# Patient Record
Sex: MALE | Race: WHITE
[De-identification: names, ages, dates, MRNs, and addresses within clinical notes are randomized per-mention and may not be internally consistent; named-entity substitution may affect disease eponyms.]

---

## 2019-08-23 ENCOUNTER — HOSPITAL ENCOUNTER (INPATIENT)
Dept: HOSPITAL 43 - DL.ED | Age: 64
LOS: 4 days | Discharge: NURSING, REHAB OR LONG-TERM CARE | DRG: 720 | End: 2019-08-27
Attending: INTERNAL MEDICINE | Admitting: INTERNAL MEDICINE
Payer: COMMERCIAL

## 2019-08-23 DIAGNOSIS — J44.9: ICD-10-CM

## 2019-08-23 DIAGNOSIS — C61: ICD-10-CM

## 2019-08-23 DIAGNOSIS — R09.81: ICD-10-CM

## 2019-08-23 DIAGNOSIS — Z79.899: ICD-10-CM

## 2019-08-23 DIAGNOSIS — Z79.82: ICD-10-CM

## 2019-08-23 DIAGNOSIS — K52.9: ICD-10-CM

## 2019-08-23 DIAGNOSIS — Z92.3: ICD-10-CM

## 2019-08-23 DIAGNOSIS — E78.00: ICD-10-CM

## 2019-08-23 DIAGNOSIS — E83.39: ICD-10-CM

## 2019-08-23 DIAGNOSIS — G25.81: ICD-10-CM

## 2019-08-23 DIAGNOSIS — E83.42: ICD-10-CM

## 2019-08-23 DIAGNOSIS — Z91.09: ICD-10-CM

## 2019-08-23 DIAGNOSIS — I10: ICD-10-CM

## 2019-08-23 DIAGNOSIS — E53.8: ICD-10-CM

## 2019-08-23 DIAGNOSIS — K21.9: ICD-10-CM

## 2019-08-23 DIAGNOSIS — C79.51: ICD-10-CM

## 2019-08-23 DIAGNOSIS — F17.210: ICD-10-CM

## 2019-08-23 DIAGNOSIS — Z90.49: ICD-10-CM

## 2019-08-23 DIAGNOSIS — A41.9: Primary | ICD-10-CM

## 2019-08-23 LAB
ANION GAP SERPL CALC-SCNC: 15.3 MMOL/L
CHLORIDE SERPL-SCNC: 98 MMOL/L (ref 101–111)
SODIUM SERPL-SCNC: 135 MMOL/L (ref 135–145)

## 2019-08-23 RX ADMIN — POTASSIUM CHLORIDE SCH MEQ: 750 TABLET, FILM COATED, EXTENDED RELEASE ORAL at 18:41

## 2019-08-23 NOTE — PCM.HP
H&P History of Present Illness





- General


Date of Service: 08/23/19


Admit Problem/Dx: 


 Admission Diagnosis/Problem





Admission Diagnosis/Problem      Hypomagnesemia








Source of Information: Patient, Old Records, Provider


History Limitations: Reports: No Limitations





- History of Present Illness


Initial Comments - Free Text/Narative: 





Mr. Alcon Thomas is a 63 y.o male with medical history significant for 

prostate cancer  with osseous metastasis s/p radiation therapy, HLP, COPD, 

syncope and collapse, RLS, s/p small bowel resection, and hypomagnesemia who 

presented to the ED with complaints of diarrhea and chills. Patient reports 

that he was seen in clinic about a week ago and was noted to have low 

Magnesium. He was started on Magnesium oxide. He was instructed to take 1 to 

1.5 pills daily. He was instructed to not take 2 pills as he would have 

diarrhea. States he decided try two of the pills yesterday and has been having 

terrible diarrhea to the point of having clear stools since yesterday. He also 

reports chills. States he had nausea this morning but could only spit up clear 

sputum. Denies melena, hematochezia, dysuria, hematuria, edema, abdominal pain, 

chest pain, shortness of breath or fevers. Denies sick contacts or trying new 

meals. Has not eaten out for a few days. Denies any recent travel. 





Reports that he drank a bottle of wine coolers yesterday but before that last 

drink was about a month ago. Smokes 1-2 packs of cigarettes daily. Denies 

illicit drug use. 





- Related Data


Allergies/Adverse Reactions: 


 Allergies











Allergy/AdvReac Type Severity Reaction Status Date / Time


 


environmental AdvReac  runny Uncoded 08/23/19 16:02





   nose/congestion  











Home Medications: 


 Home Meds





Albuterol [Proventil HFA] 2 puff INH Q4H PRN 08/23/19 [History]


Ascorbic Acid [Vitamin C] 1,000 mg PO DAILY 08/23/19 [History]


Aspirin 81 mg PO DAILY 08/23/19 [History]


Calcium Carbonate/Vitamin D3 [Calcium 500 mg Chewable Tablet] 1,200 mg pe PO 

DAILY 08/23/19 [History]


Cholecalciferol (Vitamin D3) [Vitamin D3] 1,000 unit PO DAILY 08/23/19 [History]


Cyanocobalamin (Vitamin B-12) [B-12] 1,000 mcg PO DAILY 08/23/19 [History]


Gabapentin [Neurontin] 600 mg PO BEDTIME 08/23/19 [History]


Loratadine [Claritin] 10 mg PO DAILY 08/23/19 [History]


Magnesium Oxide 400 mg PO BID 08/23/19 [History]


Omeprazole 20 mg PO BID 08/23/19 [History]


Polyethylene Glycol 3350 [MiraLAX] 17 gm PO DAILY PRN 08/23/19 [History]


Potassium Chloride 20 meq PO BID 08/23/19 [History]


Simvastatin 20 mg PO DAILY 08/23/19 [History]


Vitamin E 100 unit PO DAILY 08/23/19 [History]


rOPINIRole HCl [Ropinirole HCl] 5 mg PO DAILY 08/23/19 [History]











Past Medical History


Cardiovascular History: Reports: High Cholesterol


Gastrointestinal History: Reports: GERD


Hematologic History: Reports: Anemia, B12 Deficiency


Oncologic (Cancer) History: Reports: Prostate, Other (See Below)


Other Oncologic History: Stage 4 with mets to bone





Social & Family History





- Tobacco Use


Smoking Status *Q: Current Every Day Smoker


Years of Tobacco use: 45


Packs/Tins Daily: 1.5





- Caffeine Use


Caffeine Use: Reports: Coffee, Soda





- Recreational Drug Use


Recreational Drug Use: No





H&P Review of Systems





- Review of Systems:


Review Of Systems: ROS reveals no pertinent complaints other than HPI.





Exam





- Exam


Exam: See Below





- Vital Signs


Vital Signs: 


 Last Vital Signs











Temp  98.7 F   08/23/19 12:57


 


Pulse  66   08/23/19 12:57


 


Resp  16   08/23/19 12:57


 


BP  106/60   08/23/19 12:57


 


Pulse Ox  97   08/23/19 12:57








 





Orthostatic Blood Pressure [     94/67


Standing]                        


Orthostatic Blood Pressure [     107/66


Sitting]                         


Orthostatic Blood Pressure [     106/60


Supine]                          








Weight: 132 lb 12.8 oz





- Exam


General: Alert, Oriented


HEENT: Conjunctiva Clear, EOMI, Hearing Intact, Mucosa Moist & Pink


Neck: Supple, Trachea Midline


Lungs: Clear to Auscultation, Normal Respiratory Effort


Cardiovascular: Regular Rate, Regular Rhythm, Normal S1, Normal S2


GI/Abdominal Exam: Normal Bowel Sounds, Soft, Non-Tender, No Distention, No 

Abnormal Bruit


Extremities: Normal Inspection, Non-Tender, No Pedal Edema


Skin: Warm, Dry, Intact


Neuro Extensive - Mental Status: Alert, Oriented x3, Normal Mood/Affect, Normal 

Cognition


Psychiatric: Alert, Normal Affect, Normal Mood





- Patient Data


Lab Results Last 24 hrs: 


 Laboratory Results - last 24 hr











  08/23/19 08/23/19 Range/Units





  13:37 13:37 


 


WBC  6.1   (5.0-10.0)  10^3/uL


 


RBC  4.20 L   (4.6-6.2)  10^6/uL


 


Hgb  13.0 L   (14.0-18.0)  g/dL


 


Hct  38.0 L   (40.0-54.0)  %


 


MCV  90.5   ()  fL


 


MCH  31.0   (27.0-34.0)  pg


 


MCHC  34.2   (33.0-35.0)  g/dL


 


Plt Count  222   (150-450)  10^3/uL


 


Neut % (Auto)  79.1 H   (42.2-75.2)  %


 


Lymph % (Auto)  12.2 L   (20.5-50.1)  %


 


Mono % (Auto)  7.4   (2-8)  %


 


Eos % (Auto)  1.0   (1.0-3.0)  %


 


Baso % (Auto)  0.3   (0.0-1.0)  %


 


Sodium   135  (135-145)  mmol/L


 


Potassium   4.3  (3.6-5.0)  mmol/L


 


Chloride   98 L  (101-111)  mmol/L


 


Carbon Dioxide   26.0  (21.0-31.0)  mmol/L


 


Anion Gap   15.3  


 


BUN   14  (7-18)  mg/dL


 


Creatinine   0.9  (0.6-1.3)  mg/dL


 


Est Cr Clr Drug Dosing   71.58  mL/min


 


Estimated GFR (MDRD)   > 60  


 


BUN/Creatinine Ratio   15.55  


 


Glucose   96  ()  mg/dL


 


Calcium   9.8  (8.4-10.2)  mg/dl


 


Magnesium   0.8 L*  (1.8-2.5)  mg/dL


 


Total Bilirubin   0.7  (0.2-1.0)  mg/dL


 


AST   20  (10-42)  IU/L


 


ALT   17  (10-60)  IU/L


 


Alkaline Phosphatase   59  ()  IU/L


 


Total Protein   7.7  (6.7-8.2)  g/dl


 


Albumin   3.9  (3.2-5.5)  g/dl


 


Globulin   3.8  


 


Albumin/Globulin Ratio   1.03  











Result Diagrams: 


 08/23/19 13:37





 08/23/19 13:37





EKG INTERPRETATION


EKG Date: 08/23/19


Rhythm: NSR


Axis: Normal


QRS: RBBB (Incomplete)


ST-T: Other (repolarization abnormalities due to incomplete RBBB)


QT: Normal





- Problem List


(1) Prostate cancer metastatic to bone


SNOMED Code(s): 206703552


   ICD Code: C61 - MALIGNANT NEOPLASM OF PROSTATE; C79.51 - SECONDARY MALIGNANT 

NEOPLASM OF BONE   Status: Acute   Current Visit: Yes   





(2) Tobacco use disorder


SNOMED Code(s): 366736062


   ICD Code: F17.200 - NICOTINE DEPENDENCE, UNSPECIFIED, UNCOMPLICATED   Status

: Acute   Current Visit: Yes   





(3) Diarrhea


SNOMED Code(s): 34955746


   ICD Code: R19.7 - DIARRHEA, UNSPECIFIED   Status: Acute   Current Visit: Yes

   





(4) Hypomagnesemia


SNOMED Code(s): 436791289


   ICD Code: E83.42 - HYPOMAGNESEMIA   Status: Acute   Current Visit: Yes   


Problem List Initiated/Reviewed/Updated: Yes


Orders Last 24hrs: 


 Active Orders 24 hr











 Category Date Time Status


 


 Admission Diagnosis [ADT] Routine ADT  08/23/19 14:44 Ordered


 


 Patient Status [ADT] Routine ADT  08/23/19 14:44 Active


 


 Cardiac Monitoring [RC] CONTINUOUS Care  08/23/19 15:54 Ordered


 


 EKG Documentation Completion [RC] URGENT Care  08/23/19 14:42 Active


 


 Oxygen Therapy [RC] PRN Care  08/23/19 15:54 Ordered


 


 VTE/DVT Education [RC] PER UNIT ROUTINE Care  08/23/19 15:54 Ordered


 


 Vital Signs [RC] Q4H Care  08/23/19 15:54 Ordered


 


 Regular Diet [DIET] Diet  08/23/19 Dinner Ordered


 


 BASIC METABOLIC PANEL,BMP [CHEM] AM Lab  08/24/19 05:11 Ordered


 


 MAGNESIUM [CHEM] AM Lab  08/24/19 05:11 Ordered


 


 PHOSPHORUS [CHEM] AM Lab  08/24/19 05:11 Ordered


 


 Acetaminophen [Tylenol] Med  08/23/19 15:54 Ordered





 650 mg PO Q4H PRN   


 


 Enoxaparin [Lovenox] Med  08/24/19 09:00 Ordered





 40 mg SUBCUT DAILY   


 


 Magnesium Sulfate/Water [Magnesium Sulfate in Water Med  08/23/19 17:00 Ordered





 Premix] 2 gm   





 Premix Bag 1 bag   





 IV ONETIME   


 


 Ondansetron [Zofran] Med  08/23/19 15:54 Ordered





 4 mg IVPUSH Q6H PRN   


 


 Sodium Chloride 0.9% @ 125 MLS/HR (1000ml) Med  08/23/19 16:00 Ordered





 Sodium Chloride 0.9% [Normal Saline] 1,000 ml   





 IV ASDIRECTED   


 


 Resuscitation Status Routine Resus Stat  08/23/19 15:54 Ordered








 Medication Orders





Acetaminophen (Tylenol)  650 mg PO Q4H PRN


   PRN Reason: Pain (Mild 1-3)/fever


Enoxaparin Sodium (Lovenox)  40 mg SUBCUT DAILY SHANNAN


Sodium Chloride (Normal Saline)  1,000 mls @ 125 mls/hr IV ASDIRECTED SHANNAN


Magnesium Sulfate 2 gm/ Premix  50 mls @ 25 mls/hr IV ONETIME ONE


   Stop: 08/23/19 18:59


Ondansetron HCl (Zofran)  4 mg IVPUSH Q6H PRN


   PRN Reason: Nausea/Vomiting








Assessment/Plan Comment:: 





#Hypomagnesemia: chronic; uncertain etiology. Patient with Mag of 0.6 on 8/15/

19. Was started on oral replacement. Was 0.8 today. 


- Start on IV mag sulfate. 


- Check mag after 3 gm of Mag sulfate


- Tele monitoring





#Diarrhea: likely due to Mag oxide. 


- Denies abdominal pain, recent antibiotics, and recent travel


- No new foods. 


- LFTs normal


- Has had 4 watery bowel movements today. 


- IV fluids. 


- If diarrhea is not improving, will obtain stool studies. 





#Tobacco use disorder: smoking 1.5 - 2 packs of cigarettes daily


- NRT


- Smoking cessation counseling provided





#Prostate cancer with mets


- Follows with oncology





#COPD: not in exacerbation. 


- Continue home meds





#HLP


#HTN


- Continue home meds. 





DVT PPx: Lovenox


GI: Regular diet





Code status: Full code

## 2019-08-23 NOTE — EDM.PDOC
ED HPI GENERAL MEDICAL PROBLEM





- General


Chief Complaint: Gastrointestinal Problem


Stated Complaint: SEVERE DIARRHEA AND CHILLS


Time Seen by Provider: 08/23/19 13:45


Source of Information: Reports: Patient


History Limitations: Reports: No Limitations





- History of Present Illness


INITIAL COMMENTS - FREE TEXT/NARRATIVE: 





This 64 yo male patient reports to the ED due to diarrhea (x 2 days) and chills 

today. The patient reports he has had at least 5 watery bowel movements today 

and numerous yesterday. The patient reports he has stage 4 prostate cancer with 

mets to the back, ribs and upper abdomen. The patient was recently diagnosed 

with hypomagnesemia and placed on oral magnesium tablets. The dosing was 

increased 2 days ago and his diarrhea started yesterday. The patient reports he 

has been eating and drinking normally, but feels very thirsty. 


Onset Date: 08/22/19


Duration: Constant


Location: Reports: Abdomen


Quality: Reports: Other


Severity: Moderate


Improves with: Reports: None


Worsens with: Reports: None


Context: Reports: Other


Associated Symptoms: Reports: No Other Symptoms





- Related Data


 Allergies











Allergy/AdvReac Type Severity Reaction Status Date / Time


 


environmental AdvReac  runny Uncoded 08/23/19 13:05





   nose/congestion  











Home Meds: 


 Home Meds





Aspirin 81 mg PO DAILY 08/23/19 [History]


Omeprazole 20 mg PO BID 08/23/19 [History]


Simvastatin 20 mg PO DAILY 08/23/19 [History]











Past Medical History


Cardiovascular History: Reports: High Cholesterol


Gastrointestinal History: Reports: GERD


Hematologic History: Reports: Anemia, B12 Deficiency


Oncologic (Cancer) History: Reports: Prostate, Other (See Below)


Other Oncologic History: Stage 4 with mets to bone





Social & Family History





- Tobacco Use


Smoking Status *Q: Current Every Day Smoker


Years of Tobacco use: 45


Packs/Tins Daily: 1.5





- Caffeine Use


Caffeine Use: Reports: Coffee, Soda





- Recreational Drug Use


Recreational Drug Use: No





ED ROS GENERAL





- Review of Systems


Review Of Systems: ROS reveals no pertinent complaints other than HPI.





ED EXAM, GI/ABD





- Physical Exam


Exam: See Below


Exam Limited By: No Limitations


General Appearance: Alert, WD/WN, No Apparent Distress


Eyes: Bilateral: Normal Appearance, EOMI


Ears: Normal External Exam, Normal Canal, Hearing Grossly Normal, Normal TMs


Nose: Normal Inspection, Normal Mucosa, No Blood


Throat/Mouth: Normal Inspection, Normal Lips, Normal Teeth, Normal Gums, Normal 

Oropharynx, Normal Voice, No Airway Compromise


Head: Atraumatic, Normocephalic


Neck: Normal Inspection, Supple, Non-Tender, Full Range of Motion


Respiratory/Chest: No Respiratory Distress, Lungs Clear, Normal Breath Sounds, 

No Accessory Muscle Use, Chest Non-Tender


Cardiovascular: Normal Peripheral Pulses, Regular Rate, Rhythm, No Edema, No 

Gallop, No JVD, No Murmur, No Rub


GI/Abdominal Exam: Normal Bowel Sounds, Soft, Non-Tender, No Organomegaly, No 

Distention, No Abnormal Bruit, No Mass, Pelvis Stable


 (Male) Exam: Deferred


Rectal (Males) Exam: Deferred


Back Exam: Normal Inspection, Full Range of Motion, NT


Extremities: Normal Inspection, Normal Range of Motion, Non-Tender, Normal 

Capillary Refill, No Pedal Edema


Neurological: Alert, Oriented, CN II-XII Intact, Normal Cognition, Normal Gait, 

Normal Reflexes, No Motor/Sensory Deficits


Psychiatric: Normal Affect, Normal Mood


Skin Exam: Warm, Dry, Intact, Normal Color, No Rash


Lymphatic: No Adenopathy





Course





- Vital Signs


Last Recorded V/S: 


 Last Vital Signs











Temp  37.1 C   08/23/19 12:57


 


Pulse  66   08/23/19 12:57


 


Resp  16   08/23/19 12:57


 


BP  106/60   08/23/19 12:57


 


Pulse Ox  97   08/23/19 12:57








 





Orthostatic Blood Pressure [     94/67


Standing]                        


Orthostatic Blood Pressure [     107/66


Sitting]                         


Orthostatic Blood Pressure [     106/60


Supine]                          











- Orders/Labs/Meds


Orders: 


 Active Orders 24 hr











 Category Date Time Status


 


 EKG Documentation Completion [RC] URGENT Care  08/23/19 14:42 Ordered











Labs: 


 Laboratory Tests











  08/23/19 08/23/19 Range/Units





  13:37 13:37 


 


WBC  6.1   (5.0-10.0)  10^3/uL


 


RBC  4.20 L   (4.6-6.2)  10^6/uL


 


Hgb  13.0 L   (14.0-18.0)  g/dL


 


Hct  38.0 L   (40.0-54.0)  %


 


MCV  90.5   ()  fL


 


MCH  31.0   (27.0-34.0)  pg


 


MCHC  34.2   (33.0-35.0)  g/dL


 


Plt Count  222   (150-450)  10^3/uL


 


Neut % (Auto)  79.1 H   (42.2-75.2)  %


 


Lymph % (Auto)  12.2 L   (20.5-50.1)  %


 


Mono % (Auto)  7.4   (2-8)  %


 


Eos % (Auto)  1.0   (1.0-3.0)  %


 


Baso % (Auto)  0.3   (0.0-1.0)  %


 


Sodium   135  (135-145)  mmol/L


 


Potassium   4.3  (3.6-5.0)  mmol/L


 


Chloride   98 L  (101-111)  mmol/L


 


Carbon Dioxide   26.0  (21.0-31.0)  mmol/L


 


Anion Gap   15.3  


 


BUN   14  (7-18)  mg/dL


 


Creatinine   0.9  (0.6-1.3)  mg/dL


 


Est Cr Clr Drug Dosing   71.58  mL/min


 


Estimated GFR (MDRD)   > 60  


 


BUN/Creatinine Ratio   15.55  


 


Glucose   96  ()  mg/dL


 


Calcium   9.8  (8.4-10.2)  mg/dl


 


Magnesium   0.8 L*  (1.8-2.5)  mg/dL


 


Total Bilirubin   0.7  (0.2-1.0)  mg/dL


 


AST   20  (10-42)  IU/L


 


ALT   17  (10-60)  IU/L


 


Alkaline Phosphatase   59  ()  IU/L


 


Total Protein   7.7  (6.7-8.2)  g/dl


 


Albumin   3.9  (3.2-5.5)  g/dl


 


Globulin   3.8  


 


Albumin/Globulin Ratio   1.03  











Meds: 


Medications














Discontinued Medications














Generic Name Dose Route Start Last Admin





  Trade Name Freq  PRN Reason Stop Dose Admin


 


Sodium Chloride  1,000 mls @ 999 mls/hr  08/23/19 13:21  08/23/19 13:41





  Normal Saline  IV  08/23/19 14:21  999 mls/hr





  .BOLUS ONE   Administration





     





     





     





     














Departure





- Departure


Time of Disposition: 14:42


Disposition: Admitted As Inpatient 66


Condition: Fair


Clinical Impression: 


 Hypomagnesemia, Diarrhea








- Discharge Information


*PRESCRIPTION DRUG MONITORING PROGRAM REVIEWED*: Not Applicable


*COPY OF PRESCRIPTION DRUG MONITORING REPORT IN PATIENT BRANDY: Not Applicable


Forms:  ED Department Discharge


Care Plan Goals: 


Discussed the history, examination, lab and treatments with Dr. Pack. Dr. Pack accepted the patient for continued evaluation and treatment as an 

inpatient at [facility] in [place]. 





- My Orders


Last 24 Hours: 


My Active Orders





08/23/19 14:42


EKG Documentation Completion [RC] URGENT 














- Assessment/Plan


Last 24 Hours: 


My Active Orders





08/23/19 14:42


EKG Documentation Completion [RC] URGENT

## 2019-08-24 LAB
ANION GAP SERPL CALC-SCNC: 11.3 MMOL/L
ANION GAP SERPL CALC-SCNC: 9.8 MMOL/L
CHLORIDE SERPL-SCNC: 104 MMOL/L (ref 101–111)
CHLORIDE SERPL-SCNC: 105 MMOL/L (ref 101–111)
SODIUM SERPL-SCNC: 136 MMOL/L (ref 135–145)
SODIUM SERPL-SCNC: 137 MMOL/L (ref 135–145)

## 2019-08-24 RX ADMIN — OMEPRAZOLE SCH MG: 20 CAPSULE, DELAYED RELEASE ORAL at 16:22

## 2019-08-24 RX ADMIN — POTASSIUM CHLORIDE SCH: 750 TABLET, FILM COATED, EXTENDED RELEASE ORAL at 09:46

## 2019-08-24 RX ADMIN — OMEPRAZOLE SCH MG: 20 CAPSULE, DELAYED RELEASE ORAL at 06:10

## 2019-08-24 RX ADMIN — VITAMIN E CAP 400 UNIT SCH UNITS: 400 CAP at 09:44

## 2019-08-24 RX ADMIN — CYANOCOBALAMIN TAB 1000 MCG SCH MCG: 1000 TAB at 09:43

## 2019-08-24 RX ADMIN — POTASSIUM CHLORIDE SCH MEQ: 750 TABLET, FILM COATED, EXTENDED RELEASE ORAL at 17:59

## 2019-08-24 RX ADMIN — Medication SCH TAB: at 09:44

## 2019-08-24 RX ADMIN — VITAMIN D, TAB 1000IU (100/BT) SCH MCG: 25 TAB at 09:43

## 2019-08-24 NOTE — PCM.PN
- General Info


Date of Service: 08/24/19


Admission Dx/Problem (Free Text): 


 Admission Diagnosis/Problem





Admission Diagnosis/Problem      Hypomagnesemia








Subjective Update: 





Reports 1 watery bowel movement overnight. States he was congested overnight 

and could not sleep much.  Denies fevers, chills, chest pain, shortness of 

breath, n/v, dysuria, or hematuria. 





- Review of Systems


General: Reports: No Symptoms


HEENT: Reports: Sinus Congestion


Pulmonary: Reports: No Symptoms


Cardiovascular: Reports: No Symptoms


Gastrointestinal: Reports: Diarrhea (x1 this morning. )


Genitourinary: Reports: No Symptoms


Musculoskeletal: Reports: No Symptoms


Skin: Reports: No Symptoms


Neurological: Reports: No Symptoms


Psychiatric: Reports: No Symptoms





- Patient Data


Vitals - Most Recent: 


 Last Vital Signs











Temp  99.2 F   08/23/19 19:55


 


Pulse  71   08/23/19 19:55


 


Resp  20   08/23/19 19:55


 


BP  102/60   08/23/19 19:55


 


Pulse Ox  96   08/23/19 19:55








 





Orthostatic Blood Pressure [     94/67


Standing]                        


Orthostatic Blood Pressure [     107/66


Sitting]                         


Orthostatic Blood Pressure [     106/60


Supine]                          








Weight - Most Recent: 132 lb 12.8 oz


I&O - Last 24 Hours: 


 Intake & Output











 08/23/19 08/24/19 08/24/19





 22:59 06:59 14:59


 


Intake Total 691  


 


Output Total 150 200 


 


Balance 541 -200 











Lab Results Last 24 Hours: 


 Laboratory Results - last 24 hr











  08/23/19 08/23/19 08/23/19 Range/Units





  13:37 13:37 20:30 


 


WBC  6.1    (5.0-10.0)  10^3/uL


 


RBC  4.20 L    (4.6-6.2)  10^6/uL


 


Hgb  13.0 L    (14.0-18.0)  g/dL


 


Hct  38.0 L    (40.0-54.0)  %


 


MCV  90.5    ()  fL


 


MCH  31.0    (27.0-34.0)  pg


 


MCHC  34.2    (33.0-35.0)  g/dL


 


Plt Count  222    (150-450)  10^3/uL


 


Neut % (Auto)  79.1 H    (42.2-75.2)  %


 


Lymph % (Auto)  12.2 L    (20.5-50.1)  %


 


Mono % (Auto)  7.4    (2-8)  %


 


Eos % (Auto)  1.0    (1.0-3.0)  %


 


Baso % (Auto)  0.3    (0.0-1.0)  %


 


Sodium   135   (135-145)  mmol/L


 


Potassium   4.3   (3.6-5.0)  mmol/L


 


Chloride   98 L   (101-111)  mmol/L


 


Carbon Dioxide   26.0   (21.0-31.0)  mmol/L


 


Anion Gap   15.3   


 


BUN   14   (7-18)  mg/dL


 


Creatinine   0.9   (0.6-1.3)  mg/dL


 


Est Cr Clr Drug Dosing   71.58   mL/min


 


Estimated GFR (MDRD)   > 60   


 


BUN/Creatinine Ratio   15.55   


 


Glucose   96   ()  mg/dL


 


Calcium   9.8   (8.4-10.2)  mg/dl


 


Phosphorus     (2.5-4.6)  mg/dL


 


Magnesium   0.8 L*  2.1  (1.8-2.5)  mg/dL


 


Total Bilirubin   0.7   (0.2-1.0)  mg/dL


 


AST   20   (10-42)  IU/L


 


ALT   17   (10-60)  IU/L


 


Alkaline Phosphatase   59   ()  IU/L


 


Total Protein   7.7   (6.7-8.2)  g/dl


 


Albumin   3.9   (3.2-5.5)  g/dl


 


Globulin   3.8   


 


Albumin/Globulin Ratio   1.03   














  08/24/19 Range/Units





  06:00 


 


WBC   (5.0-10.0)  10^3/uL


 


RBC   (4.6-6.2)  10^6/uL


 


Hgb   (14.0-18.0)  g/dL


 


Hct   (40.0-54.0)  %


 


MCV   ()  fL


 


MCH   (27.0-34.0)  pg


 


MCHC   (33.0-35.0)  g/dL


 


Plt Count   (150-450)  10^3/uL


 


Neut % (Auto)   (42.2-75.2)  %


 


Lymph % (Auto)   (20.5-50.1)  %


 


Mono % (Auto)   (2-8)  %


 


Eos % (Auto)   (1.0-3.0)  %


 


Baso % (Auto)   (0.0-1.0)  %


 


Sodium  137  (135-145)  mmol/L


 


Potassium  4.3  (3.6-5.0)  mmol/L


 


Chloride  104  (101-111)  mmol/L


 


Carbon Dioxide  26.0  (21.0-31.0)  mmol/L


 


Anion Gap  11.3  


 


BUN  15  (7-18)  mg/dL


 


Creatinine  0.8  (0.6-1.3)  mg/dL


 


Est Cr Clr Drug Dosing  80.53  mL/min


 


Estimated GFR (MDRD)  > 60  


 


BUN/Creatinine Ratio   


 


Glucose  112 H  ()  mg/dL


 


Calcium  8.2 L D  (8.4-10.2)  mg/dl


 


Phosphorus  2.3 L  (2.5-4.6)  mg/dL


 


Magnesium  1.8  (1.8-2.5)  mg/dL


 


Total Bilirubin   (0.2-1.0)  mg/dL


 


AST   (10-42)  IU/L


 


ALT   (10-60)  IU/L


 


Alkaline Phosphatase   ()  IU/L


 


Total Protein   (6.7-8.2)  g/dl


 


Albumin   (3.2-5.5)  g/dl


 


Globulin   


 


Albumin/Globulin Ratio   











Med Orders - Current: 


 Current Medications





Acetaminophen (Tylenol)  650 mg PO Q4H PRN


   PRN Reason: Pain (Mild 1-3)/fever


Albuterol (Proventil Hfa)  0 gm INH Q4H PRN


   PRN Reason: Wheezing


Ascorbic Acid (Vitamin C)  1,000 mg PO DAILY [facility]


   Last Admin: 08/24/19 09:44 Dose:  1,000 mg


Aspirin (Aspirin)  81 mg PO DAILY [facility]


   Last Admin: 08/24/19 09:44 Dose:  81 mg


Calcium Carbonate (Calcium Carbonate/Vitamin D 1250 Mg-200 Unit)  1 tab PO 

DAILY [facility]


   Last Admin: 08/24/19 09:44 Dose:  1 tab


Cholecalciferol (Vitamin D3)  25 mcg PO DAILY [facility]


   Last Admin: 08/24/19 09:43 Dose:  25 mcg


Cyanocobalamin (Vitamin B12)  1,000 mcg PO DAILY [facility]


   Last Admin: 08/24/19 09:43 Dose:  1,000 mcg


Enoxaparin Sodium (Lovenox)  40 mg SUBCUT DAILY [facility]


   Last Admin: 08/24/19 09:46 Dose:  Not Given


Gabapentin (Neurontin)  600 mg PO BEDTIME [facility]


   Last Admin: 08/23/19 20:36 Dose:  600 mg


Sodium Chloride (Normal Saline)  1,000 mls @ 500 mls/hr IV .BOLUS ONE


   Stop: 08/24/19 11:52


Loratadine (Claritin)  10 mg PO DAILY [facility]


   Last Admin: 08/24/19 09:43 Dose:  10 mg


Miscellaneous Information (Check Patch)  1 ea TRDERM BEDTIME [facility]


   Last Admin: 08/23/19 20:38 Dose:  1 ea


Nicotine (Habitrol)  21 mg TRDERM DAILY [facility]


   Last Admin: 08/24/19 09:46 Dose:  21 mg


Omeprazole (Omeprazole)  20 mg PO BIDAC [facility]


   Last Admin: 08/24/19 06:10 Dose:  20 mg


Ondansetron HCl (Zofran)  4 mg IVPUSH Q6H PRN


   PRN Reason: Nausea/Vomiting


Potassium Chloride (Klor-Con 10)  20 meq PO BIDMEALS [facility]


   Last Admin: 08/24/19 09:46 Dose:  Not Given


Ropinirole HCl (Requip)  5 mg PO BEDTIME [facility]


   Last Admin: 08/23/19 20:36 Dose:  5 mg


Simvastatin (Zocor)  20 mg PO BEDTIME [facility]


   Last Admin: 08/23/19 20:36 Dose:  20 mg


Vitamin E (Vitamin E)  400 units PO DAILY [facility]


   Last Admin: 08/24/19 09:44 Dose:  400 units





Discontinued Medications





Sodium Chloride (Normal Saline)  1,000 mls @ 999 mls/hr IV .BOLUS ONE


   Stop: 08/23/19 14:21


   Last Admin: 08/23/19 13:41 Dose:  999 mls/hr


Magnesium Sulfate/Dextrose 1 (gm/ Premix)  100 mls @ 100 mls/hr IV ONETIME ONE


   Stop: 08/23/19 15:48


   Last Infusion: 08/23/19 17:18 Dose:  Infused


Sodium Chloride (Normal Saline)  1,000 mls @ 125 mls/hr IV ASDIRECTED [facility]


   Last Admin: 08/23/19 17:16 Dose:  125 mls/hr


Magnesium Sulfate 2 gm/ Premix  50 mls @ 25 mls/hr IV ONETIME ONE


   Stop: 08/23/19 18:59


   Last Admin: 08/23/19 17:14 Dose:  25 mls/hr


Magnesium Oxide (Magnesium Oxide)  250 mg PO WITHBREAKFAST [facility]


Sodium Phosphate (Neutra-Phos)  500 mg PO ONETIME ONE


   Stop: 08/24/19 09:25











- Exam


General: Alert, Oriented, No Acute Distress


HEENT: Pupils Equal, Pupils Reactive, Mucous Membr. Moist/Pink


Neck: Supple


Lungs: Clear to Auscultation, Normal Respiratory Effort


Cardiovascular: Regular Rate, Regular Rhythm


GI/Abdominal Exam: Normal Bowel Sounds, Soft, Non-Tender, No Distention


Extremities: Non-Tender, No Pedal Edema, Mottled


Skin: Warm, Dry, Intact


Neurological: No New Focal Deficit


Psy/Mental Status: Alert, Normal Affect, Normal Mood





- Problem List & Annotations


(1) Prostate cancer metastatic to bone


SNOMED Code(s): 838834480


   Code(s): C61 - MALIGNANT NEOPLASM OF PROSTATE; C79.51 - SECONDARY MALIGNANT 

NEOPLASM OF BONE   Status: Acute   Current Visit: Yes   





(2) Tobacco use disorder


SNOMED Code(s): 866165967


   Code(s): F17.200 - NICOTINE DEPENDENCE, UNSPECIFIED, UNCOMPLICATED   Status: 

Acute   Current Visit: Yes   





(3) Diarrhea


SNOMED Code(s): 31751971


   Code(s): R19.7 - DIARRHEA, UNSPECIFIED   Status: Acute   Current Visit: Yes 

  





(4) Hypomagnesemia


SNOMED Code(s): 370594513


   Code(s): E83.42 - HYPOMAGNESEMIA   Status: Acute   Current Visit: Yes   





- Problem List Review


Problem List Initiated/Reviewed/Updated: Yes





- My Orders


Last 24 Hours: 


My Active Orders





08/23/19 15:54


Cardiac Monitoring [RC] 09,21 


Oxygen Therapy [RC] PRN 


VTE/DVT Education [RC] PER UNIT ROUTINE 


Vital Signs [RC] Q4H 


Acetaminophen [Tylenol]   650 mg PO Q4H PRN 


Ondansetron [Zofran]   4 mg IVPUSH Q6H PRN 


Resuscitation Status Routine 





08/23/19 16:09


Albuterol [Proventil HFA]   0 gm INH Q4H PRN 





08/23/19 16:15


Nicotine [Habitrol]   21 mg TRDERM DAILY 





08/23/19 18:00


Potassium Chloride [Klor-Con 10]   20 meq PO BIDMEALS 





08/23/19 21:00


Check Patch   1 ea TRDERM BEDTIME 


Gabapentin [Neurontin]   600 mg PO BEDTIME 


Simvastatin [Zocor]   20 mg PO BEDTIME 


rOPINIRole [Requip]   5 mg PO BEDTIME 





08/23/19 Dinner


Regular Diet [DIET] 





08/24/19 06:00


Omeprazole   20 mg PO BIDAC 





08/24/19 09:00


Ascorbic Acid [Vitamin C]   1,000 mg PO DAILY 


Aspirin   81 mg PO DAILY 


Calcium Carbonate/Vitamin D3 [Calcium Carbonate/Vitamin D 1250 MG-200 Unit]   1 

tab PO DAILY 


Cholecalciferol (Vitamin D3) [Vitamin D3]   25 mcg PO DAILY 


Cyanocobalamin (Vitamin B12) [Vitamin B12]   1,000 mcg PO DAILY 


Enoxaparin [Lovenox]   40 mg SUBCUT DAILY 


Loratadine [Claritin]   10 mg PO DAILY 


Vitamin E (dl, acetate) [Vitamin E]   400 units PO DAILY 





08/24/19 09:53


Sodium Chloride 0.9% [Normal Saline] 1,000 ml IV .BOLUS 














- Plan


Plan:: 





#Hypomagnesemia: Improved. chronic; uncertain etiology. Patient with Mag of 0.6 

on 8/15/19. Was started on oral replacement. Was 0.8 on admit. 


- Went up to 2.1 after 3 g of mag sulfate. 


- Down to 1.8 this morning. 


- Due to recurrence of diarrhea, will give 1g IV mag sulfate and recheck 

levels. 


- If within normal range, will d/c to follow up as outpatient.


- Tele monitoring





#Diarrhea: likely due to Mag oxide. 


- Denies abdominal pain, recent antibiotics, and recent travel


- No new foods. 


- LFTs normal


- Has had 4 watery bowel movements on day of admit. 


- Diarrhea resolved until this morning with 1 watery bowel movement. 


- IV fluids. 





#Nasal congestion:


- Saline nasal spray.





#Tobacco use disorder: smoking 1.5 - 2 packs of cigarettes daily


- NRT


- Smoking cessation counseling provided





#Prostate cancer with mets


- Follows with oncology





#COPD: not in exacerbation. 


- Continue home meds





#HLP


#HTN: BP is at goal, on the lower side today.


- Continue zocor


- Give 1000 cc of IVF over two hours





DVT PPx: Lovenox


GI: Regular diet





Code status: Full code

## 2019-08-25 LAB
ANION GAP SERPL CALC-SCNC: 11.5 MMOL/L
CHLORIDE SERPL-SCNC: 105 MMOL/L (ref 101–111)
SODIUM SERPL-SCNC: 134 MMOL/L (ref 135–145)

## 2019-08-25 RX ADMIN — CYANOCOBALAMIN TAB 1000 MCG SCH MCG: 1000 TAB at 08:30

## 2019-08-25 RX ADMIN — OMEPRAZOLE SCH MG: 20 CAPSULE, DELAYED RELEASE ORAL at 05:57

## 2019-08-25 RX ADMIN — OMEPRAZOLE SCH MG: 20 CAPSULE, DELAYED RELEASE ORAL at 15:29

## 2019-08-25 RX ADMIN — VITAMIN D, TAB 1000IU (100/BT) SCH MCG: 25 TAB at 08:30

## 2019-08-25 RX ADMIN — Medication SCH TAB: at 08:30

## 2019-08-25 RX ADMIN — POTASSIUM CHLORIDE SCH MEQ: 750 TABLET, FILM COATED, EXTENDED RELEASE ORAL at 17:40

## 2019-08-25 RX ADMIN — POTASSIUM CHLORIDE SCH MEQ: 750 TABLET, FILM COATED, EXTENDED RELEASE ORAL at 08:30

## 2019-08-25 RX ADMIN — VITAMIN E CAP 400 UNIT SCH UNITS: 400 CAP at 08:30

## 2019-08-25 NOTE — PCM.PN
- General Info


Date of Service: 08/25/19


Admission Dx/Problem (Free Text): 


 Admission Diagnosis/Problem





Admission Diagnosis/Problem      Hypomagnesemia








Subjective Update: 





Reports 3 watery bowel movements this morning. Was febrile last night and this 

morning. Denies chest pain, shortness of breath, n/v, dysuria, or hematuria. 





- Patient Data


Vitals - Most Recent: 


 Last Vital Signs











Temp  100.5 F   08/25/19 07:47


 


Pulse  80   08/25/19 07:47


 


Resp  18   08/25/19 07:47


 


BP  118/60   08/25/19 07:47


 


Pulse Ox  93 L  08/25/19 07:47








 





Orthostatic Blood Pressure [     94/67


Standing]                        


Orthostatic Blood Pressure [     107/66


Sitting]                         


Orthostatic Blood Pressure [     106/60


Supine]                          








Weight - Most Recent: 132 lb 12.8 oz


I&O - Last 24 Hours: 


 Intake & Output











 08/24/19 08/25/19 08/25/19





 22:59 06:59 14:59


 


Intake Total 587 203 180


 


Balance 587 203 180











Lab Results Last 24 Hours: 


 Laboratory Results - last 24 hr











  08/24/19 08/24/19 08/24/19 Range/Units





  12:39 12:39 12:39 


 


D-Dimer, Quantitative  522 H    (0-400)  ng/mL


 


Sodium   136   (135-145)  mmol/L


 


Potassium   3.8   (3.6-5.0)  mmol/L


 


Chloride   105   (101-111)  mmol/L


 


Carbon Dioxide   25.0   (21.0-31.0)  mmol/L


 


Anion Gap   9.8   


 


BUN   13   (7-18)  mg/dL


 


Creatinine   0.8   (0.6-1.3)  mg/dL


 


Est Cr Clr Drug Dosing   80.53   mL/min


 


Estimated GFR (MDRD)   > 60   


 


Glucose   99   ()  mg/dL


 


Calcium   7.6 L   (8.4-10.2)  mg/dl


 


Phosphorus   2.1 L   (2.5-4.6)  mg/dL


 


Magnesium   2.1   (1.8-2.5)  mg/dL


 


Troponin I   < 0.02   (0.00-0.02)  ng/ml


 


Albumin    2.9 L  (3.2-5.5)  g/dl














  08/24/19 Range/Units





  18:40 


 


D-Dimer, Quantitative   (0-400)  ng/mL


 


Sodium   (135-145)  mmol/L


 


Potassium   (3.6-5.0)  mmol/L


 


Chloride   (101-111)  mmol/L


 


Carbon Dioxide   (21.0-31.0)  mmol/L


 


Anion Gap   


 


BUN   (7-18)  mg/dL


 


Creatinine   (0.6-1.3)  mg/dL


 


Est Cr Clr Drug Dosing   mL/min


 


Estimated GFR (MDRD)   


 


Glucose   ()  mg/dL


 


Calcium   (8.4-10.2)  mg/dl


 


Phosphorus   (2.5-4.6)  mg/dL


 


Magnesium   (1.8-2.5)  mg/dL


 


Troponin I  < 0.02  (0.00-0.02)  ng/ml


 


Albumin   (3.2-5.5)  g/dl











Med Orders - Current: 


 Current Medications





Acetaminophen (Tylenol)  650 mg PO Q6H PRN


   PRN Reason: Fever Greater Than 101


   Last Admin: 08/25/19 08:33 Dose:  650 mg


Albuterol (Proventil Hfa)  0 gm INH Q4H PRN


   PRN Reason: Wheezing


Ascorbic Acid (Vitamin C)  1,000 mg PO DAILY [facility]


   Last Admin: 08/25/19 08:30 Dose:  1,000 mg


Aspirin (Aspirin)  81 mg PO DAILY [facility]


   Last Admin: 08/25/19 08:30 Dose:  81 mg


Calcium Carbonate (Calcium Carbonate/Vitamin D 1250 Mg-200 Unit)  1 tab PO 

DAILY [facility]


   Last Admin: 08/25/19 08:30 Dose:  1 tab


Cholecalciferol (Vitamin D3)  25 mcg PO DAILY [facility]


   Last Admin: 08/25/19 08:30 Dose:  25 mcg


Cyanocobalamin (Vitamin B12)  1,000 mcg PO DAILY [facility]


   Last Admin: 08/25/19 08:30 Dose:  1,000 mcg


Enoxaparin Sodium (Lovenox)  40 mg SUBCUT DAILY [facility]


   Last Admin: 08/25/19 08:32 Dose:  Not Given


Gabapentin (Neurontin)  600 mg PO BEDTIME [facility]


   Last Admin: 08/24/19 20:40 Dose:  600 mg


Piperacillin Sod/Tazobactam (Sod 3.375 gm/ Sodium Chloride)  100 mls @ 200 mls/

hr IV Q6H [facility]


   Last Admin: 08/25/19 10:58 Dose:  200 mls/hr


Vancomycin HCl 1 gm/ Sodium (Chloride)  250 mls @ 166.667 mls/hr IV Q12H [facility]


   Last Admin: 08/25/19 05:55 Dose:  166.667 mls/hr


Sodium Chloride (Normal Saline)  1,000 mls @ 75 mls/hr IV ASDIRECTED [facility]


   Stop: 08/26/19 11:16


Loratadine (Claritin)  10 mg PO DAILY [facility]


   Last Admin: 08/25/19 08:30 Dose:  10 mg


Miscellaneous Information (Check Patch)  1 ea TRDERM BEDTIME [facility]


   Last Admin: 08/24/19 21:00 Dose:  1 ea


Nicotine (Habitrol)  21 mg TRDERM DAILY [facility]


   Last Admin: 08/25/19 08:31 Dose:  21 mg


Omeprazole (Omeprazole)  20 mg PO BIDAC [facility]


   Last Admin: 08/25/19 05:57 Dose:  20 mg


Ondansetron HCl (Zofran)  4 mg IVPUSH Q6H PRN


   PRN Reason: Nausea/Vomiting


Potassium Chloride (Klor-Con 10)  20 meq PO BIDMEALS [facility]


   Last Admin: 08/25/19 08:30 Dose:  20 meq


Ropinirole HCl (Requip)  5 mg PO BEDTIME [facility]


   Last Admin: 08/24/19 20:41 Dose:  5 mg


Simvastatin (Zocor)  20 mg PO BEDTIME [facility]


   Last Admin: 08/24/19 20:40 Dose:  20 mg


Sodium Chloride ([place] Nasal Spray)  0 ml MIKE Q4H PRN


   PRN Reason: Congestion


Vancomycin HCl (Pharmacy To Dose - Vancomycin)  1 dose .XX ASDIRECTED [facility]


Vitamin E (Vitamin E)  400 units PO DAILY [facility]


   Last Admin: 08/25/19 08:30 Dose:  400 units





Discontinued Medications





Acetaminophen (Tylenol)  650 mg PO Q4H PRN


   PRN Reason: Pain (Mild 1-3)/fever


Sodium Chloride (Normal Saline)  1,000 mls @ 999 mls/hr IV .BOLUS ONE


   Stop: 08/23/19 14:21


   Last Admin: 08/23/19 13:41 Dose:  999 mls/hr


Magnesium Sulfate/Dextrose 1 (gm/ Premix)  100 mls @ 100 mls/hr IV ONETIME ONE


   Stop: 08/23/19 15:48


   Last Infusion: 08/23/19 17:18 Dose:  Infused


Sodium Chloride (Normal Saline)  1,000 mls @ 125 mls/hr IV ASDIRECTED [facility]


   Last Admin: 08/23/19 17:16 Dose:  125 mls/hr


Magnesium Sulfate 2 gm/ Premix  50 mls @ 25 mls/hr IV ONETIME ONE


   Stop: 08/23/19 18:59


   Last Admin: 08/23/19 17:14 Dose:  25 mls/hr


Sodium Chloride (Normal Saline)  1,000 mls @ 500 mls/hr IV .BOLUS ONE


   Stop: 08/24/19 11:52


   Last Admin: 08/24/19 10:08 Dose:  500 mls/hr


Magnesium Sulfate/Dextrose (Magnesium Sulfate In D5w 100 Premix)  100 mls @ 100 

mls/hr IV ONETIME ONE


   Stop: 08/24/19 11:59


   Last Admin: 08/24/19 11:03 Dose:  100 mls/hr


Magnesium Oxide (Magnesium Oxide)  250 mg PO WITHBREAKFAST [facility]


   Last Admin: 08/24/19 10:15 Dose:  Not Given


Sodium Phosphate (Neutra-Phos)  500 mg PO ONETIME ONE


   Stop: 08/24/19 09:25


   Last Admin: 08/24/19 10:08 Dose:  500 mg











- Exam


General: Alert, Oriented, Cooperative, Moderate Distress


HEENT: Pupils Equal, Pupils Reactive, Mucous Membr. Moist/Pink


Lungs: Clear to Auscultation, Normal Respiratory Effort


Cardiovascular: Regular Rate, Regular Rhythm


GI/Abdominal Exam: Normal Bowel Sounds, Soft, No Distention, Tender


Extremities: Normal Inspection, Non-Tender, No Pedal Edema


Skin: Warm, Dry, Intact


Neurological: No New Focal Deficit


Psy/Mental Status: Alert, Normal Affect, Normal Mood





- Problem List & Annotations


(1) Prostate cancer metastatic to bone


SNOMED Code(s): 109574745


   Code(s): C61 - MALIGNANT NEOPLASM OF PROSTATE; C79.51 - SECONDARY MALIGNANT 

NEOPLASM OF BONE   Status: Acute   Current Visit: Yes   





(2) Tobacco use disorder


SNOMED Code(s): 460607170


   Code(s): F17.200 - NICOTINE DEPENDENCE, UNSPECIFIED, UNCOMPLICATED   Status: 

Acute   Current Visit: Yes   





(3) Diarrhea


SNOMED Code(s): 05383974


   Code(s): R19.7 - DIARRHEA, UNSPECIFIED   Status: Acute   Current Visit: Yes 

  





(4) Hypomagnesemia


SNOMED Code(s): 433354313


   Code(s): E83.42 - HYPOMAGNESEMIA   Status: Acute   Current Visit: Yes   





- Problem List Review


Problem List Initiated/Reviewed/Updated: Yes





- My Orders


Last 24 Hours: 


My Active Orders





08/24/19 12:35


Sodium Chloride 0.65% [[place]cean Nasal Spray]   See Dose Instructions  MIKE Q4H PRN 





08/24/19 16:43


Blood Culture x2 Reflex Set [OM.PC] Stat 





08/24/19 16:51


Acetaminophen [Tylenol]   650 mg PO Q6H PRN 





08/24/19 17:00


Pharmacy to Dose - Vancomycin   1 dose .XX ASDIRECTED 


Piperacillin/Tazobactam [Zosyn] 3.375 gm   Sodium Chloride 0.9% [Normal Saline] 

100 ml IV Q6H 





08/24/19 17:46


CULTURE BLOOD [BC] Stat 


CULTURE BLOOD [BC] Stat 





08/24/19 18:00


Vancomycin 1 gm   Sodium Chloride 0.9% [Normal Saline] 250 ml IV Q12H 





08/25/19 10:43


BASIC METABOLIC PANEL,BMP [CHEM] Routine 


MAGNESIUM [CHEM] Routine 





08/25/19 11:12


Chest Abdomen Pelvis w Cont [CT] Urgent 





08/25/19 11:15


Sodium Chloride 0.9% @  75 MLS/HR(1000ml) Sodium Chloride 0.9% [Normal Saline] 1

,000 ml IV ASDIRECTED 














- Plan


Plan:: 





Sepsis: probable abdominal source. Patient with temp >102 and RR of 20. 

Immunocompromised. 


- Follow up on blood cultures


- Continue vancomycin and zosyn. 


- CT chest, abdomen, and pelvis, 


- Stool studies, lactic acid, and lipase. 


- Rule out C diff 





#Hypomagnesemia: Improved. chronic; uncertain etiology. Patient with Mag of 0.6 

on 8/15/19. Was started on oral replacement. Was 0.8 on admit. 


- Went up to 2.1 after 3 g of mag sulfate. 


-Trended down to 1.8. Was given an extra 1 g of IV mag oxide


- Tele monitoring





#Diarrhea: likely due to Mag oxide. Possibly infectious as patient continues to 

have diarrhea off mag oxide.


- Now with abdominal pain


- Denies recent antibiotics, and recent travel, or  new foods. 


- LFTs normal


- Had 4 watery bowel movements on day of admit. 


- IV fluids. 





#Nasal congestion:


- Saline nasal spray.





#Tobacco use disorder: smoking 1.5 - 2 packs of cigarettes daily


- NRT


- Smoking cessation counseling provided





#Prostate cancer with mets


- Follows with oncology


- I will notify oncologist about admission per patient's request. 





#COPD: not in exacerbation. 


- Continue home meds





#HLP


#HTN: BP is at goal, on the lower side today.


- Continue zocor


- Give 1000 cc of IVF over two hours





DVT PPx: Lovenox


GI: Regular diet





Code status: Full code

## 2019-08-26 LAB
ANION GAP SERPL CALC-SCNC: 10.5 MMOL/L
CHLORIDE SERPL-SCNC: 108 MMOL/L (ref 101–111)
SODIUM SERPL-SCNC: 135 MMOL/L (ref 135–145)

## 2019-08-26 RX ADMIN — VITAMIN E CAP 400 UNIT SCH UNITS: 400 CAP at 08:47

## 2019-08-26 RX ADMIN — DIBASIC SODIUM PHOSPHATE, MONOBASIC POTASSIUM PHOSPHATE AND MONOBASIC SODIUM PHOSPHATE SCH MG: 852; 155; 130 TABLET ORAL at 10:09

## 2019-08-26 RX ADMIN — DIBASIC SODIUM PHOSPHATE, MONOBASIC POTASSIUM PHOSPHATE AND MONOBASIC SODIUM PHOSPHATE SCH MG: 852; 155; 130 TABLET ORAL at 22:06

## 2019-08-26 RX ADMIN — OMEPRAZOLE SCH MG: 20 CAPSULE, DELAYED RELEASE ORAL at 05:16

## 2019-08-26 RX ADMIN — DIBASIC SODIUM PHOSPHATE, MONOBASIC POTASSIUM PHOSPHATE AND MONOBASIC SODIUM PHOSPHATE SCH MG: 852; 155; 130 TABLET ORAL at 13:44

## 2019-08-26 RX ADMIN — POTASSIUM CHLORIDE SCH MEQ: 750 TABLET, FILM COATED, EXTENDED RELEASE ORAL at 08:46

## 2019-08-26 RX ADMIN — POTASSIUM CHLORIDE SCH MEQ: 750 TABLET, FILM COATED, EXTENDED RELEASE ORAL at 17:37

## 2019-08-26 RX ADMIN — Medication SCH TAB: at 08:47

## 2019-08-26 RX ADMIN — OMEPRAZOLE SCH MG: 20 CAPSULE, DELAYED RELEASE ORAL at 16:08

## 2019-08-26 RX ADMIN — VITAMIN D, TAB 1000IU (100/BT) SCH MCG: 25 TAB at 08:55

## 2019-08-26 RX ADMIN — CYANOCOBALAMIN TAB 1000 MCG SCH MCG: 1000 TAB at 08:47

## 2019-08-26 NOTE — PCM.PN
- General Info


Date of Service: 08/26/19


Admission Dx/Problem (Free Text): 


 Admission Diagnosis/Problem





Admission Diagnosis/Problem      Hypomagnesemia








Subjective Update: 





Reports still having watery bowel movements this morning. Was afebrile last 

night and this morning. Denies chest pain, shortness of breath, n/v, dysuria, 

or hematuria. 





- Patient Data


Vitals - Most Recent: 


 Last Vital Signs











Temp  97.5 F   08/26/19 07:41


 


Pulse  65   08/26/19 07:41


 


Resp  18   08/26/19 07:41


 


BP  93/57 L  08/26/19 07:41


 


Pulse Ox  97   08/26/19 07:41








 





Orthostatic Blood Pressure [     94/67


Standing]                        


Orthostatic Blood Pressure [     107/66


Sitting]                         


Orthostatic Blood Pressure [     106/60


Supine]                          








Weight - Most Recent: 132 lb 12.8 oz


I&O - Last 24 Hours: 


 Intake & Output











 08/25/19 08/26/19 08/26/19





 22:59 06:59 14:59


 


Intake Total 868 1433 240


 


Balance 868 1433 240











Lab Results Last 24 Hours: 


 Laboratory Results - last 24 hr











  08/25/19 08/25/19 08/25/19 Range/Units





  10:43 12:57 12:57 


 


WBC     (5.0-10.0)  10^3/uL


 


RBC     (4.6-6.2)  10^6/uL


 


Hgb     (14.0-18.0)  g/dL


 


Hct     (40.0-54.0)  %


 


MCV     ()  fL


 


MCH     (27.0-34.0)  pg


 


MCHC     (33.0-35.0)  g/dL


 


Plt Count     (150-450)  10^3/uL


 


Sodium  134 L    (135-145)  mmol/L


 


Potassium  3.5 L    (3.6-5.0)  mmol/L


 


Chloride  105    (101-111)  mmol/L


 


Carbon Dioxide  21.0    (21.0-31.0)  mmol/L


 


Anion Gap  11.5    


 


BUN  13    (7-18)  mg/dL


 


Creatinine  1.0    (0.6-1.3)  mg/dL


 


Est Cr Clr Drug Dosing  64.42    mL/min


 


Estimated GFR (MDRD)  > 60    


 


Glucose  112 H    ()  mg/dL


 


Lactic Acid    1.6  (0.5-2.2)  mmol/L


 


Calcium  7.5 L    (8.4-10.2)  mg/dl


 


Phosphorus     (2.5-4.6)  mg/dL


 


Magnesium  1.6 L    (1.8-2.5)  mg/dL


 


Lipase   25   (22-51)  U/L














  08/26/19 08/26/19 Range/Units





  06:05 06:05 


 


WBC  7.3   (5.0-10.0)  10^3/uL


 


RBC  3.59 L   (4.6-6.2)  10^6/uL


 


Hgb  10.9 L D   (14.0-18.0)  g/dL


 


Hct  33.0 L   (40.0-54.0)  %


 


MCV  91.9   ()  fL


 


MCH  30.4   (27.0-34.0)  pg


 


MCHC  33.0   (33.0-35.0)  g/dL


 


Plt Count  190   (150-450)  10^3/uL


 


Sodium   135  (135-145)  mmol/L


 


Potassium   3.5 L  (3.6-5.0)  mmol/L


 


Chloride   108  (101-111)  mmol/L


 


Carbon Dioxide   20.0 L  (21.0-31.0)  mmol/L


 


Anion Gap   10.5  


 


BUN   9  (7-18)  mg/dL


 


Creatinine   0.9  (0.6-1.3)  mg/dL


 


Est Cr Clr Drug Dosing   71.58  mL/min


 


Estimated GFR (MDRD)   > 60  


 


Glucose   101  ()  mg/dL


 


Lactic Acid    (0.5-2.2)  mmol/L


 


Calcium   7.2 L  (8.4-10.2)  mg/dl


 


Phosphorus   1.5 L  (2.5-4.6)  mg/dL


 


Magnesium   1.7 L  (1.8-2.5)  mg/dL


 


Lipase    (22-51)  U/L











[place] Results Last 24 Hours: 


 Microbiology











 08/25/19 12:03 Stool Culture - Preliminary





 Stool / Feces    NORMAL ENTERIC OMAIRA.  NO SALMONELLA, SHIGELLA,





    CAMPYLOBACTER OR E.COLI O157 ISOLATED.


 


 08/24/19 17:46 Aerobic Blood Culture - Preliminary





 Blood - Venous - Lab Draw    NO GROWTH AFTER 1 DAY





 Anaerobic Blood Culture - Preliminary





    NO GROWTH AFTER 1 DAY


 


 08/24/19 17:46 Aerobic Blood Culture - Preliminary





 Blood - Venous    NO GROWTH AFTER 1 DAY





 Anaerobic Blood Culture - Preliminary





    NO GROWTH AFTER 1 DAY











Med Orders - Current: 


 Current Medications





Acetaminophen (Tylenol)  650 mg PO Q6H PRN


   PRN Reason: Fever Greater Than 101


   Last Admin: 08/26/19 04:43 Dose:  650 mg


Albuterol (Proventil Hfa)  0 gm INH Q4H PRN


   PRN Reason: Wheezing


Ascorbic Acid (Vitamin C)  1,000 mg PO DAILY [facility]


   Last Admin: 08/26/19 08:48 Dose:  1,000 mg


Aspirin (Aspirin)  81 mg PO DAILY [facility]


   Last Admin: 08/26/19 08:48 Dose:  81 mg


Calcium Carbonate (Calcium Carbonate/Vitamin D 1250 Mg-200 Unit)  1 tab PO 

DAILY [facility]


   Last Admin: 08/26/19 08:47 Dose:  1 tab


Cholecalciferol (Vitamin D3)  25 mcg PO DAILY [facility]


   Last Admin: 08/26/19 08:55 Dose:  25 mcg


Ciprofloxacin (Ciprofloxacin Hcl)  500 mg PO BID [facility]


   Stop: 08/31/19 09:16


   Last Admin: 08/26/19 10:10 Dose:  500 mg


Cyanocobalamin (Vitamin B12)  1,000 mcg PO DAILY [facility]


   Last Admin: 08/26/19 08:47 Dose:  1,000 mcg


Enoxaparin Sodium (Lovenox)  40 mg SUBCUT DAILY [facility]


   Last Admin: 08/26/19 08:49 Dose:  Not Given


Gabapentin (Neurontin)  600 mg PO BEDTIME [facility]


   Last Admin: 08/25/19 19:59 Dose:  600 mg


Sodium Chloride (Normal Saline)  1,000 mls @ 75 mls/hr IV ASDIRECTED [facility]


   Stop: 08/26/19 11:16


   Last Admin: 08/26/19 04:37 Dose:  75 mls/hr


Magnesium Sulfate 2 gm/ Premix  50 mls @ 25 mls/hr IV ONETIME ONE


   Stop: 08/26/19 11:07


   Last Admin: 08/26/19 10:05 Dose:  25 mls/hr


Loratadine (Claritin)  10 mg PO DAILY [facility]


   Last Admin: 08/26/19 08:48 Dose:  10 mg


Metronidazole (Metronidazole)  500 mg PO Q8HR [facility]


   Last Admin: 08/26/19 10:10 Dose:  500 mg


Miscellaneous Information (Check Patch)  1 ea TRDERM BEDTIME [facility]


   Last Admin: 08/25/19 20:00 Dose:  1 ea


Nicotine (Habitrol)  21 mg TRDERM DAILY [facility]


   Last Admin: 08/26/19 08:49 Dose:  21 mg


Omeprazole (Omeprazole)  20 mg PO BIDAC [facility]


   Last Admin: 08/26/19 05:16 Dose:  20 mg


Ondansetron HCl (Zofran)  4 mg IVPUSH Q6H PRN


   PRN Reason: Nausea/Vomiting


Potassium Chloride (Klor-Con 10)  20 meq PO BIDMEALS [facility]


   Last Admin: 08/26/19 08:46 Dose:  20 meq


Ropinirole HCl (Requip)  5 mg PO BEDTIME [facility]


   Last Admin: 08/25/19 19:59 Dose:  5 mg


Simvastatin (Zocor)  20 mg PO BEDTIME [facility]


   Last Admin: 08/25/19 19:59 Dose:  20 mg


Sodium Chloride ([place]cean Nasal Spray)  0 ml MIKE Q4H PRN


   PRN Reason: Congestion


Sodium Phosphate (Neutra-Phos)  500 mg PO TID [facility]


   Stop: 08/27/19 21:01


   Last Admin: 08/26/19 10:09 Dose:  500 mg


Vitamin E (Vitamin E)  400 units PO DAILY [facility]


   Last Admin: 08/26/19 08:47 Dose:  400 units





Discontinued Medications





Acetaminophen (Tylenol)  650 mg PO Q4H PRN


   PRN Reason: Pain (Mild 1-3)/fever


Sodium Chloride (Normal Saline)  1,000 mls @ 999 mls/hr IV .BOLUS ONE


   Stop: 08/23/19 14:21


   Last Admin: 08/23/19 13:41 Dose:  999 mls/hr


Magnesium Sulfate/Dextrose 1 (gm/ Premix)  100 mls @ 100 mls/hr IV ONETIME ONE


   Stop: 08/23/19 15:48


   Last Infusion: 08/23/19 17:18 Dose:  Infused


Sodium Chloride (Normal Saline)  1,000 mls @ 125 mls/hr IV ASDIRECTED [facility]


   Last Admin: 08/23/19 17:16 Dose:  125 mls/hr


Magnesium Sulfate 2 gm/ Premix  50 mls @ 25 mls/hr IV ONETIME ONE


   Stop: 08/23/19 18:59


   Last Admin: 08/23/19 17:14 Dose:  25 mls/hr


Sodium Chloride (Normal Saline)  1,000 mls @ 500 mls/hr IV .BOLUS ONE


   Stop: 08/24/19 11:52


   Last Admin: 08/24/19 10:08 Dose:  500 mls/hr


Magnesium Sulfate/Dextrose (Magnesium Sulfate In D5w 100 Premix)  100 mls @ 100 

mls/hr IV ONETIME ONE


   Stop: 08/24/19 11:59


   Last Admin: 08/24/19 11:03 Dose:  100 mls/hr


Piperacillin Sod/Tazobactam (Sod 3.375 gm/ Sodium Chloride)  100 mls @ 200 mls/

hr IV Q6H [facility]


   Last Infusion: 08/26/19 06:07 Dose:  Infused


Vancomycin HCl 1 gm/ Sodium (Chloride)  250 mls @ 166.667 mls/hr IV Q12H [facility]


   Last Admin: 08/26/19 05:16 Dose:  166.667 mls/hr


Magnesium Sulfate/Dextrose 1 (gm/ Premix)  100 mls @ 100 mls/hr IV ONETIME ONE


   Stop: 08/25/19 12:38


   Last Infusion: 08/25/19 13:55 Dose:  Infused


Potassium Chloride 10 meq/ (Premix)  100 mls @ 100 mls/hr IV Q1H [facility]


   Stop: 08/25/19 15:59


   Last Infusion: 08/25/19 17:40 Dose:  Infused


Iopamidol (Isovue-300 (61%))  100 ml IVPUSH ONETIME ONE


   Stop: 08/25/19 11:38


   Last Admin: 08/25/19 12:26 Dose:  100 ml


Magnesium Oxide (Magnesium Oxide)  250 mg PO WITHBREAKFAST [facility]


   Last Admin: 08/24/19 10:15 Dose:  Not Given


Sodium Phosphate (Neutra-Phos)  500 mg PO ONETIME ONE


   Stop: 08/24/19 09:25


   Last Admin: 08/24/19 10:08 Dose:  500 mg


Vancomycin HCl (Pharmacy To Dose - Vancomycin)  1 dose .XX ASDIRECTED [facility]











- Exam


General: Alert, Oriented, Cooperative, No Acute Distress


HEENT: Pupils Equal, Pupils Reactive, Mucous Membr. Moist/Pink


Neck: Supple


Lungs: Clear to Auscultation, Normal Respiratory Effort


Cardiovascular: Regular Rate, Regular Rhythm


GI/Abdominal Exam: Normal Bowel Sounds, Soft, Non-Tender, No Distention


Extremities: Normal Inspection, Non-Tender, No Pedal Edema


Skin: Warm, Dry, Intact


Neurological: No New Focal Deficit


Psy/Mental Status: Alert, Normal Affect, Normal Mood





- Problem List & Annotations


(1) Prostate cancer metastatic to bone


SNOMED Code(s): 246306496


   Code(s): C61 - MALIGNANT NEOPLASM OF PROSTATE; C79.51 - SECONDARY MALIGNANT 

NEOPLASM OF BONE   Status: Acute   Current Visit: Yes   





(2) Tobacco use disorder


SNOMED Code(s): 955276689


   Code(s): F17.200 - NICOTINE DEPENDENCE, UNSPECIFIED, UNCOMPLICATED   Status: 

Acute   Current Visit: Yes   





(3) Diarrhea


SNOMED Code(s): 18044341


   Code(s): R19.7 - DIARRHEA, UNSPECIFIED   Status: Acute   Current Visit: Yes 

  





(4) Hypomagnesemia


SNOMED Code(s): 769785075


   Code(s): E83.42 - HYPOMAGNESEMIA   Status: Acute   Current Visit: Yes   





- Problem List Review


Problem List Initiated/Reviewed/Updated: Yes





- My Orders


Last 24 Hours: 


My Active Orders





08/25/19 11:12


Chest Abdomen Pelvis w Cont [CT] Urgent 





08/25/19 11:15


Sodium Chloride 0.9% [Normal Saline] 1,000 ml IV ASDIRECTED 





08/25/19 11:21


Isolation [COMM] Stat 





08/25/19 12:03


CLOSTRIDIUM DIFFICILE TOX RFLX [MREF] Urgent 


CULTURE STOOL [RM] Routine 


SHIGA TOXIN 1 & 2 [MREF] Routine 





08/26/19 09:08


Magnesium Sulfate/Water [Magnesium Sulfate in Water Premix] 2 gm   Premix Bag 1 

bag IV ONETIME 





08/26/19 09:15


Ciprofloxacin [Ciprofloxacin HCl]   500 mg PO BID 


metroNIDAZOLE   500 mg PO Q8HR 





08/26/19 09:30


Phosphorus #1 [Neutra-Phos]   500 mg PO TID 














- Plan


Plan:: 





#Sepsis: probable abdominal source. Patient with temp >102 and RR of 20. 

Immunocompromised. 


- Follow up on blood cultures


- Discontinue vancomycin and zosyn. 


- Start cipro and flagyl


- CT chest, abdomen, and pelvis shows colitis and probable gastroenteritis. 


- Stool studies, lactic acid, and lipase. 


- Rule out C diff 





#Hypomagnesemia: Improved. chronic; uncertain etiology. Patient with Mag of 0.6 

on 8/15/19. Was started on oral replacement. Was 0.8 on admit. 


- Went up to 2.1 after 3 g of mag sulfate. 


-Trended down to 1.7. 


- Given an extra 1 g of IV mag oxide


- Tele monitoring





#Hypophosphatemia: Phos of 1.5. 


- No IV phos available here. 


- Oral Phos replacement. 


- Monitor electrolytes and replace as needed. 





#Gastroenteritis + Diarrhea: Possibly infectious as patient continues to have 

diarrhea off mag oxide.


- Abdominal pain is improved. 


- Denies recent antibiotics, and recent travel, or  new foods. 


- LFTs normal


- Had 4 watery bowel movements on day of admit. 


- IV fluids. 


- Cipro and flagyl


- Follow up on C diff


- Stool studies negative so far.





#Nasal congestion:


- Saline nasal spray.





#Tobacco use disorder: smoking 1.5 - 2 packs of cigarettes daily


- NRT


- Smoking cessation counseling provided





#Prostate cancer with mets


- Follows with oncology


- Discussed patient's presentation and current management with Dr. Garcia, he 

will coordinate for patient's appointment to be rescheduled. 


- Patient notified. 





#COPD: not in exacerbation. 


- Continue home meds





#HLP


#HTN: BP is at goal, on the lower side today.


- Continue zocor





DVT PPx: Lovenox


GI: Regular diet





Code status: Full code

## 2019-08-27 LAB
ANION GAP SERPL CALC-SCNC: 12 MMOL/L
CHLORIDE SERPL-SCNC: 109 MMOL/L (ref 101–111)
SODIUM SERPL-SCNC: 140 MMOL/L (ref 135–145)

## 2019-08-27 RX ADMIN — OMEPRAZOLE SCH MG: 20 CAPSULE, DELAYED RELEASE ORAL at 05:48

## 2019-08-27 RX ADMIN — VITAMIN D, TAB 1000IU (100/BT) SCH MCG: 25 TAB at 09:14

## 2019-08-27 RX ADMIN — POTASSIUM CHLORIDE SCH MEQ: 750 TABLET, FILM COATED, EXTENDED RELEASE ORAL at 09:11

## 2019-08-27 RX ADMIN — DIBASIC SODIUM PHOSPHATE, MONOBASIC POTASSIUM PHOSPHATE AND MONOBASIC SODIUM PHOSPHATE SCH MG: 852; 155; 130 TABLET ORAL at 09:11

## 2019-08-27 RX ADMIN — Medication SCH TAB: at 09:12

## 2019-08-27 RX ADMIN — VITAMIN E CAP 400 UNIT SCH UNITS: 400 CAP at 09:09

## 2019-08-27 RX ADMIN — CYANOCOBALAMIN TAB 1000 MCG SCH MCG: 1000 TAB at 09:09

## 2019-08-27 NOTE — [id number]
**Discharge Summary





- Hospital Course


Free Text/Narrative:: 





Mr. Alcon Thomas is a 63 y.o male with medical history significant for 

prostate cancer  with osseous metastasis s/p radiation therapy, HLP, COPD, 

syncope and collapse, RLS, s/p small bowel resection, and hypomagnesemia who 

presented to the ED with complaints of diarrhea and chills. Mag was 0.8 on 

admission. He was placed on telemetry and started on IV magnesium sulfate. Mag 

oxide was discontinued. Diarrhea slowed overnight but recurred the following 

day. He developed fevers. Blood and stool studies were obtained. CT chest, 

abdomen, and pelvis was obtained. Showed colitis. He was transitioned from 

vancomycin and zosyn to cipro and flagyl. Stool studies resulted today with 

positive fungus. C diff negative. Continued to need phos and magnesium 

replacements. He is being transferred to [facility] for further management with 

possible ID consultation. 


HPI Initial Comments: 





Mr. Alcon Thomas is a 63 y.o male with medical history significant for 

prostate cancer  with osseous metastasis s/p radiation therapy, HLP, COPD, 

syncope and collapse, RLS, s/p small bowel resection, and hypomagnesemia who 

presented to the ED with complaints of diarrhea and chills. Patient reports 

that he was seen in clinic about a week ago and was noted to have low 

Magnesium. He was started on Magnesium oxide. He was instructed to take 1 to 

1.5 pills daily. He was instructed to not take 2 pills as he would have 

diarrhea. States he decided try two of the pills yesterday and has been having 

terrible diarrhea to the point of having clear stools since yesterday. He also 

reports chills. States he had nausea this morning but could only spit up clear 

sputum. Denies melena, hematochezia, dysuria, hematuria, edema, abdominal pain, 

chest pain, shortness of breath or fevers. Denies sick contacts or trying new 

meals. Has not eaten out for a few days. Denies any recent travel. 





Reports that he drank a bottle of wine coolers yesterday but before that last 

drink was about a month ago. Smokes 1-2 packs of cigarettes daily. Denies 

illicit drug use. 





Diagnosis: Stroke: No





- Discharge Data


Discharge Date: 08/27/19


Discharge Disposition: DC/Tfer to Acute Hospital 02


Condition: Stable





- Discharge Diagnosis/Problem(s)


(1) Prostate cancer metastatic to bone


SNOMED Code(s): 107479883


   ICD Code: C61 - MALIGNANT NEOPLASM OF PROSTATE; C79.51 - SECONDARY MALIGNANT 

NEOPLASM OF BONE   Status: Acute   Current Visit: Yes   





(2) Tobacco use disorder


SNOMED Code(s): 220311863


   ICD Code: F17.200 - NICOTINE DEPENDENCE, UNSPECIFIED, UNCOMPLICATED   Status

: Acute   Current Visit: Yes   





(3) Diarrhea


SNOMED Code(s): 04681138


   ICD Code: R19.7 - DIARRHEA, UNSPECIFIED   Status: Acute   Current Visit: Yes

   





(4) Hypomagnesemia


SNOMED Code(s): 361892375


   ICD Code: E83.42 - HYPOMAGNESEMIA   Status: Acute   Current Visit: Yes   





- Discharge Plan


*PRESCRIPTION DRUG MONITORING PROGRAM REVIEWED*: Not Applicable


*COPY OF PRESCRIPTION DRUG MONITORING REPORT IN PATIENT BRANDY: Not Applicable


Home Medications: 


 Home Meds





Albuterol [Proventil HFA] 2 puff INH Q4H PRN 08/23/19 [History]


Ascorbic Acid [Vitamin C] 1,000 mg PO DAILY 08/23/19 [History]


Aspirin 81 mg PO DAILY 08/23/19 [History]


Calcium Carbonate/Vitamin D3 [Calcium 500 mg Chewable Tablet] 1,200 mg pe PO 

DAILY 08/23/19 [History]


Cholecalciferol (Vitamin D3) [Vitamin D3] 1,000 unit PO DAILY 08/23/19 [History]


Cyanocobalamin (Vitamin B-12) [B-12] 1,000 mcg PO DAILY 08/23/19 [History]


Gabapentin [Neurontin] 600 mg PO BEDTIME 08/23/19 [History]


Loratadine [Claritin] 10 mg PO DAILY 08/23/19 [History]


Omeprazole 20 mg PO BID 08/23/19 [History]


Potassium Chloride 20 meq PO BID 08/23/19 [History]


Simvastatin 20 mg PO DAILY 08/23/19 [History]


Vitamin E 100 unit PO DAILY 08/23/19 [History]


rOPINIRole HCl [Ropinirole HCl] 5 mg PO DAILY 08/23/19 [History]


Ciprofloxacin [Ciprofloxacin HCl] 500 mg PO BID  tablet 08/27/19 [Rx]


Nicotine [Habitrol] 21 mg TRDERM DAILY  patch 08/27/19 [Rx]


Phosphorus #1 [Neutra-Phos] 500 mg PO TID  tablet 08/27/19 [Rx]


metroNIDAZOLE 500 mg PO Q8HR  tablet 08/27/19 [Rx]








Forms:  ED Department Discharge


Referrals: 


Roula Nation MD [Primary Care Provider] - 





- Discharge Summary/Plan Comment


DC Time >30 min.: Yes





- General Info


Date of Service: 08/27/19


Admission Dx/Problem (Free Text: 


 Admission Diagnosis/Problem





Admission Diagnosis/Problem      Hypomagnesemia








Subjective Update: 





Reports still having watery and loose bowel movements this morning. Was 

afebrile last night and this morning. Denies chest pain, shortness of breath, n/

v, dysuria, or hematuria. 





- Patient Data


Vitals - Most Recent: 


 Last Vital Signs











Temp  97.4 F   08/27/19 08:00


 


Pulse  60   08/27/19 08:00


 


Resp  18   08/27/19 08:00


 


BP  117/65   08/27/19 08:00


 


Pulse Ox  99   08/27/19 08:00








 





Orthostatic Blood Pressure [     94/67


Standing]                        


Orthostatic Blood Pressure [     107/66


Sitting]                         


Orthostatic Blood Pressure [     106/60


Supine]                          








Weight - Most Recent: 132 lb 12.8 oz


I&O - Last 24 hours: 


 Intake & Output











 08/26/19 08/27/19 08/27/19





 22:59 06:59 14:59


 


Intake Total 240 550 


 


Balance 240 550 











Lab Results - Last 24 hrs: 


 Laboratory Results - last 24 hr











  08/27/19 08/27/19 Range/Units





  06:00 06:00 


 


WBC  4.8 L   (5.0-10.0)  10^3/uL


 


RBC  3.87 L   (4.6-6.2)  10^6/uL


 


Hgb  11.8 L   (14.0-18.0)  g/dL


 


Hct  35.1 L   (40.0-54.0)  %


 


MCV  90.7   ()  fL


 


MCH  30.5   (27.0-34.0)  pg


 


MCHC  33.6   (33.0-35.0)  g/dL


 


Plt Count  229   (150-450)  10^3/uL


 


Sodium   140  (135-145)  mmol/L


 


Potassium   4.0  (3.6-5.0)  mmol/L


 


Chloride   109  (101-111)  mmol/L


 


Carbon Dioxide   23.0  (21.0-31.0)  mmol/L


 


Anion Gap   12.0  


 


BUN   10  (7-18)  mg/dL


 


Creatinine   0.7  (0.6-1.3)  mg/dL


 


Est Cr Clr Drug Dosing   92.03  mL/min


 


Estimated GFR (MDRD)   > 60  


 


Glucose   100  ()  mg/dL


 


Calcium   8.1 L  (8.4-10.2)  mg/dl


 


Phosphorus   1.9 L  (2.5-4.6)  mg/dL


 


Magnesium   1.8  (1.8-2.5)  mg/dL











CH[facility]STIAN Results - Last 24 hrs: 


 Microbiology











 08/25/19 12:03 Clostridioides difficile (PCR) - Final





 Stool / Feces 


 


 08/25/19 12:03 Stool Culture - Preliminary





 Stool / Feces 


 


 08/24/19 17:46 Aerobic Blood Culture - Preliminary





 Blood - Venous - Lab Draw    NO GROWTH AFTER 2 DAYS





 Anaerobic Blood Culture - Preliminary





    NO GROWTH AFTER 2 DAYS


 


 08/24/19 17:46 Aerobic Blood Culture - Preliminary





 Blood - Venous    NO GROWTH AFTER 2 DAYS





 Anaerobic Blood Culture - Preliminary





    NO GROWTH AFTER 2 DAYS











Med Orders - Current: 


 Current Medications





Acetaminophen (Tylenol)  650 mg PO Q6H PRN


   PRN Reason: Fever Greater Than 101


   Last Admin: 08/26/19 04:43 Dose:  650 mg


Albuterol (Proventil Hfa)  0 gm INH Q4H PRN


   PRN Reason: Wheezing


Ascorbic Acid (Vitamin C)  1,000 mg PO DAILY [facility]


   Last Admin: 08/27/19 09:09 Dose:  1,000 mg


Aspirin (Aspirin)  81 mg PO DAILY [facility]


   Last Admin: 08/27/19 09:10 Dose:  81 mg


Cholecalciferol (Vitamin D3)  25 mcg PO DAILY [facility]


   Last Admin: 08/27/19 09:14 Dose:  25 mcg


Ciprofloxacin (Ciprofloxacin Hcl)  500 mg PO BID [facility]


   Stop: 08/31/19 09:16


   Last Admin: 08/27/19 09:10 Dose:  500 mg


Cyanocobalamin (Vitamin B12)  1,000 mcg PO DAILY [facility]


   Last Admin: 08/27/19 09:09 Dose:  1,000 mcg


Enoxaparin Sodium (Lovenox)  40 mg SUBCUT DAILY [facility]


   Last Admin: 08/27/19 09:13 Dose:  Not Given


Gabapentin (Neurontin)  600 mg PO BEDTIME [facility]


   Last Admin: 08/26/19 22:06 Dose:  600 mg


Loratadine (Claritin)  10 mg PO DAILY [facility]


   Last Admin: 08/27/19 09:12 Dose:  10 mg


Metronidazole (Metronidazole)  500 mg PO Q8HR [facility]


   Last Admin: 08/27/19 05:48 Dose:  500 mg


Miscellaneous Information (Check Patch)  1 ea TRDERM BEDTIME [facility]


   Last Admin: 08/26/19 22:04 Dose:  1 ea


Nicotine (Habitrol)  21 mg TRDERM DAILY [facility]


   Last Admin: 08/27/19 09:13 Dose:  21 mg


Omeprazole (Omeprazole)  20 mg PO BIDAC [facility]


   Last Admin: 08/27/19 05:48 Dose:  20 mg


Ondansetron HCl (Zofran)  4 mg IVPUSH Q6H PRN


   PRN Reason: Nausea/Vomiting


Potassium Chloride (Klor-Con 10)  20 meq PO BIDMEALS [facility]


   Last Admin: 08/27/19 09:11 Dose:  20 meq


Ropinirole HCl (Requip)  5 mg PO BEDTIME [facility]


   Last Admin: 08/26/19 22:07 Dose:  5 mg


Simvastatin (Zocor)  20 mg PO BEDTIME [facility]


   Last Admin: 08/26/19 22:05 Dose:  20 mg


Sodium Chloride (Ocean Nasal Spray)  0 ml MIKE Q4H PRN


   PRN Reason: Congestion


Sodium Phosphate (Neutra-Phos)  500 mg PO TID [facility]


   Stop: 08/27/19 21:01


   Last Admin: 08/27/19 09:11 Dose:  500 mg


Vitamin E (Vitamin E)  400 units PO DAILY [facility]


   Last Admin: 08/27/19 09:09 Dose:  400 units





Discontinued Medications





Acetaminophen (Tylenol)  650 mg PO Q4H PRN


   PRN Reason: Pain (Mild 1-3)/fever


Calcium Carbonate (Calcium Carbonate/Vitamin D 1250 Mg-200 Unit)  1 tab PO 

DAILY [facility]


   Last Admin: 08/27/19 09:12 Dose:  1 tab


Sodium Chloride (Normal Saline)  1,000 mls @ 999 mls/hr IV .BOLUS ONE


   Stop: 08/23/19 14:21


   Last Admin: 08/23/19 13:41 Dose:  999 mls/hr


Magnesium Sulfate/Dextrose 1 (gm/ Premix)  100 mls @ 100 mls/hr IV ONETIME ONE


   Stop: 08/23/19 15:48


   Last Infusion: 08/23/19 17:18 Dose:  Infused


Sodium Chloride (Normal Saline)  1,000 mls @ 125 mls/hr IV ASDIRECTED [facility]


   Last Admin: 08/23/19 17:16 Dose:  125 mls/hr


Magnesium Sulfate 2 gm/ Premix  50 mls @ 25 mls/hr IV ONETIME ONE


   Stop: 08/23/19 18:59


   Last Admin: 08/23/19 17:14 Dose:  25 mls/hr


Sodium Chloride (Normal Saline)  1,000 mls @ 500 mls/hr IV .BOLUS ONE


   Stop: 08/24/19 11:52


   Last Admin: 08/24/19 10:08 Dose:  500 mls/hr


Magnesium Sulfate/Dextrose (Magnesium Sulfate In D5w 100 Premix)  100 mls @ 100 

mls/hr IV ONETIME ONE


   Stop: 08/24/19 11:59


   Last Admin: 08/24/19 11:03 Dose:  100 mls/hr


Piperacillin Sod/Tazobactam (Sod 3.375 gm/ Sodium Chloride)  100 mls @ 200 mls/

hr IV Q6H [facility]


   Last Infusion: 08/26/19 06:07 Dose:  Infused


Vancomycin HCl 1 gm/ Sodium (Chloride)  250 mls @ 166.667 mls/hr IV Q12H [facility]


   Last Admin: 08/26/19 05:16 Dose:  166.667 mls/hr


Sodium Chloride (Normal Saline)  1,000 mls @ 75 mls/hr IV ASDIRECTED [facility]


   Stop: 08/26/19 11:16


   Last Admin: 08/26/19 04:37 Dose:  75 mls/hr


Magnesium Sulfate/Dextrose 1 (gm/ Premix)  100 mls @ 100 mls/hr IV ONETIME ONE


   Stop: 08/25/19 12:38


   Last Infusion: 08/25/19 13:55 Dose:  Infused


Potassium Chloride 10 meq/ (Premix)  100 mls @ 100 mls/hr IV Q1H [facility]


   Stop: 08/25/19 15:59


   Last Infusion: 08/25/19 17:40 Dose:  Infused


Magnesium Sulfate 2 gm/ Premix  50 mls @ 25 mls/hr IV ONETIME ONE


   Stop: 08/26/19 11:07


   Last Infusion: 08/26/19 12:05 Dose:  Infused


Magnesium Sulfate 2 gm/ Premix  50 mls @ 25 mls/hr IV ONETIME ONE


   Stop: 08/26/19 16:07


   Last Admin: 08/26/19 14:12 Dose:  Not Given


Iopamidol (Isovue-300 (61%))  100 ml IVPUSH ONETIME ONE


   Stop: 08/25/19 11:38


   Last Admin: 08/25/19 12:26 Dose:  100 ml


Magnesium Oxide (Magnesium Oxide)  250 mg PO WITHBREAKFAST [facility]


   Last Admin: 08/24/19 10:15 Dose:  Not Given


Sodium Phosphate (Neutra-Phos)  500 mg PO ONETIME ONE


   Stop: 08/24/19 09:25


   Last Admin: 08/24/19 10:08 Dose:  500 mg


Vancomycin HCl (Pharmacy To Dose - Vancomycin)  1 dose .XX ASDIRECTED [facility]











- Exam


General: Reports: Alert, Oriented, Cooperative, No Acute Distress


HEENT: Reports: Pupils Equal, Pupils Reactive, Mucous Membr. Moist/Pink


Neck: Reports: Supple


Lungs: Reports: Clear to Auscultation, Normal Respiratory Effort


Cardiovascular: Reports: Regular Rate, Regular Rhythm


GI/Abdominal Exam: Normal Bowel Sounds, Soft, Non-Tender, No Distention


Extremities: Normal Inspection, Non-Tender, No Pedal Edema


Skin: Reports: Warm, Dry, Intact


Neurological: Reports: No New Focal Deficit


Psy/Mental Status: Reports: Alert, Normal Affect, Normal Mood